# Patient Record
Sex: MALE
[De-identification: names, ages, dates, MRNs, and addresses within clinical notes are randomized per-mention and may not be internally consistent; named-entity substitution may affect disease eponyms.]

---

## 2022-04-16 ENCOUNTER — TRANSCRIPTION ENCOUNTER (OUTPATIENT)
Age: 32
End: 2022-04-16

## 2023-12-26 ENCOUNTER — NON-APPOINTMENT (OUTPATIENT)
Age: 33
End: 2023-12-26

## 2024-07-21 ENCOUNTER — EMERGENCY (EMERGENCY)
Facility: HOSPITAL | Age: 34
LOS: 0 days | Discharge: HOME | End: 2024-07-21
Attending: EMERGENCY MEDICINE
Payer: COMMERCIAL

## 2024-07-21 VITALS
TEMPERATURE: 98 F | RESPIRATION RATE: 20 BRPM | WEIGHT: 179.9 LBS | HEART RATE: 77 BPM | HEIGHT: 68 IN | DIASTOLIC BLOOD PRESSURE: 72 MMHG | OXYGEN SATURATION: 99 % | SYSTOLIC BLOOD PRESSURE: 112 MMHG
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
## 2024-07-21 DIAGNOSIS — S63.502A UNSPECIFIED SPRAIN OF LEFT WRIST, INITIAL ENCOUNTER: ICD-10-CM

## 2024-07-21 DIAGNOSIS — S70.312A ABRASION, LEFT THIGH, INITIAL ENCOUNTER: ICD-10-CM

## 2024-07-21 DIAGNOSIS — S70.311A ABRASION, RIGHT THIGH, INITIAL ENCOUNTER: ICD-10-CM

## 2024-07-21 DIAGNOSIS — M25.532 PAIN IN LEFT WRIST: ICD-10-CM
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
## 2024-07-21 PROCEDURE — 99284 EMERGENCY DEPT VISIT MOD MDM: CPT

## 2024-07-21 PROCEDURE — 73110 X-RAY EXAM OF WRIST: CPT | Mod: LT

## 2024-07-21 PROCEDURE — 99283 EMERGENCY DEPT VISIT LOW MDM: CPT | Mod: 25

## 2024-07-21 PROCEDURE — 73110 X-RAY EXAM OF WRIST: CPT | Mod: 26,LT

## 2024-07-21 NOTE — ED PROVIDER NOTE - NSFOLLOWUPINSTRUCTIONS_ED_ALL_ED_FT
Our Emergency Department Referral Coordinators will be reaching out to you in the next 24-48 hours from 9:00am to 5:00pm to schedule a follow up appointment. Please expect a phone call from the hospital in that time frame. If you do not receive a call or if you have any questions or concerns, you can reach them at   [phone].    PLEASE FOLLOW UP WITH PMD AND ORTHO.     Wrist Pain, Adult    There are many things that can cause wrist pain. Some common causes include:    An injury to the wrist area, such as a sprain, strain, or fracture.  Overuse of the joint.  A condition that causes increased pressure on a nerve in the wrist (carpal tunnel syndrome).  Wear and tear of the joints that occurs with aging (osteoarthritis).  A variety of other types of arthritis.    Sometimes, the cause of wrist pain is not known. Often, the pain goes away when you follow instructions from your health care provider for relieving pain at home, such as resting or icing the wrist. If your wrist pain continues, it is important to tell your health care provider.    Follow these instructions at home:  Rest the wrist area for at least 48 hours or as long as told by your health care provider.  If a splint or elastic bandage has been applied, use it as told by your health care provider.    Remove the splint or bandage only as told by your health care provider.  Loosen the splint or bandage if your fingers tingle, become numb, or turn cold or blue.    ImageIf directed, apply ice to the injured area.    If you have a removable splint or elastic bandage, remove it as told by your health care provider.  Put ice in a plastic bag.  Place a towel between your skin and the bag or between your splint or bandage and the bag.  Leave the ice on for 20 minutes, 2–3 times a day.    Keep your arm raised (elevated) above the level of your heart while you are sitting or lying down.  Take over-the-counter and prescription medicines only as told by your health care provider.  Keep all follow-up visits as told by your health care provider. This is important.  Contact a health care provider if:  You have a sudden sharp pain in the wrist, hand, or arm that is different or new.  The swelling or bruising on your wrist or hand gets worse.  Your skin becomes red, gets a rash, or has open sores.  Your pain does not get better or it gets worse.  Get help right away if:  You lose feeling in your fingers or hand.  Your fingers turn white, very red, or cold and blue.  You cannot move your fingers.  You have a fever or chills.  This information is not intended to replace advice given to you by your health care provider. Make sure you discuss any questions you have with your health care provider.    Abrasion     An abrasion is a cut or a scrape on the outer surface of the skin. An abrasion does not go through all the layers of the skin. It is important to care for your abrasion properly to prevent infection.    What are the causes?  This condition is caused by falling on or gliding across the ground or another surface. When your skin rubs on something, the outer and inner layers of skin may rub off.    What are the signs or symptoms?  The main symptom of this condition is a cut or a scrape. The scrape may be bleeding, or it may appear red or pink. If the abrasion was caused by a fall, there may be a bruise under the cut or scrape.    How is this diagnosed?  An abrasion is diagnosed with a physical exam.    How is this treated?  Treatment for this condition depends on how large and deep the abrasion is. In most cases:  Your abrasion will be cleaned with water and mild soap. This is done to remove any dirt or debris (such as particles of glass or rock) that may be stuck in the wound.  An antibiotic ointment may be applied to the abrasion to help prevent infection.  A bandage (dressing) may be placed on the abrasion to keep it clean.  You may also need a tetanus shot.    Follow these instructions at home:  Medicines     Take or apply over-the-counter and prescription medicines only as told by your health care provider.  If you were prescribed an antibiotic medicine, apply it as told by your health care provider.  Wound care     Clean the wound 2–3 times a day, or as directed by your health care provider. To do this, wash the wound with mild soap and water, rinse off the soap, and pat the wound dry with a clean towel. Do not rub the wound.  Keep the dressing clean and dry as told by your health care provider.  There are many different ways to close and cover a wound. Follow instructions from your health care provider about:  Caring for your wound.  Changing and removing your dressing. You may have to change your dressing one or more times a day, or as directed by your health care provider.  Check your wound every day for signs of infection. Check for:  Redness, particularly a red streak that spreads out from the wound.  Swelling or increased pain.  Warmth.  Fluid, pus, or a bad smell.  If directed, put ice on the injured area to reduce pain and swelling:  Put ice in a plastic bag.   Place a towel between your skin and the bag.   Leave the ice on for 20 minutes, 2–3 times a day.  General instructions     Do not take baths, swim, or use a hot tub until your health care provider says it is okay to do so.  If possible, raise (elevate) the injured area above the level of your heart while you are sitting or lying down. This will reduce pain and swelling.  Keep all follow-up visits as directed by your health care provider. This is important.  Contact a health care provider if:  You received a tetanus shot, and you have swelling, severe pain, redness, or bleeding at the injection site.  Your pain is not controlled with medicine.  You have redness, swelling, or more pain at the site of your wound.  Get help right away if:  You have a red streak spreading away from your wound.  You have a fever.  You have fluid, blood, or pus coming from your wound.  You notice a bad smell coming from your wound or your dressing.  Summary  An abrasion is a cut or a scrape on the outer surface of the skin. An abrasion does not go through all the layers of the skin.  Care for your abrasion properly to prevent infection.  Clean the wound with mild soap and water 2–3 times a day. Follow instructions from your health care provider about taking medicines and changing your bandage (dressing).  Contact your health care provider if you have redness, swelling or more pain in the wound area.  Get help right away if you have a fever or if you have fluid, blood, pus, a bad smell, or a red streak coming from the wound.  This information is not intended to replace advice given to you by your health care provider. Make sure you discuss any questions you have with your health care provider.

## 2024-07-21 NOTE — ED PROVIDER NOTE - PATIENT PORTAL LINK FT
You can access the FollowMyHealth Patient Portal offered by [facility] by registering at the following website: [email or web address]. By joining [facility]’s FollowMyHealth portal, you will also be able to view your health information using other applications (apps) compatible with our system.

## 2024-07-21 NOTE — ED PROVIDER NOTE - PROGRESS NOTE DETAILS
ED Attending KYLIE Agrawal  Patient aware of x-ray, able to fully range wrist, did not want anything for the pain, aware of x-ray, placed in Ace bandage, understands symptomatic treatment, RICE, signs and symptoms to return for.  Reports will follow-up with PMD and Ortho.  Neurovascularly intact.  Patient comfortable and agrees with plan.

## 2024-07-21 NOTE — ED PROVIDER NOTE - DIFFERENTIAL DIAGNOSIS
The differential diagnosis for patients clinical presentation includes but is not limited to:  fracture, sprain, strain, abrasion , contusion, dislocation Differential Diagnosis

## 2024-07-21 NOTE — ED PROVIDER NOTE - OBJECTIVE STATEMENT
34-year-old male with no significant past medical history, up-to-date on tetanus, presents with left wrist pain after playing soccer around 10 AM, reports localized to the medial portion of the left wrist, throbbing, mild, nonradiating, worse with movement or palpation, better at rest.  Patient reports he took Tylenol at 3:30 PM.  Does not want anything for the pain in the ED.  Patient also sustained superficial abrasions to the bilateral thighs but denies any pain or discomfort to these areas, no knee pain, no ankle pain, no other pain.  Patient reports he came in for the wrist pain.  No head trauma or LOC.   No fever, chills, nausea, vomiting, numbness/tingling, decreased sensation, hearing a click/feeling a pop, or any other trauma.

## 2024-07-21 NOTE — ED PROVIDER NOTE - CLINICAL SUMMARY MEDICAL DECISION MAKING FREE TEXT BOX
On Exam:   Vital Signs: I have reviewed the initial vital signs.  Constitutional: Non toxic appearing pt sitting on stretcher speaking full sentences.   Integumentary: No rash. No lacerations, swelling or ecchymoses. b/l thighs with superficial abrasions. no erythema  or streaking, no warmth to palpation, no crepitus, induration, fluctuance, no discharge,no abscess, (+) soft compartments.  ENT: MMM  NECK: Supple, non-tender, no meningeal signs.  Cardiovascular: RRR, radial pulses 2/4 b/l.Cap refill < 2 seconds.   Respiratory: BS present b/l, ctabl, no wheezing or crackles, good air exchange, good resp effort and excursion, no accessory muscle use, no stridor.  Gastrointestinal: BS present throughout all 4 quadrants, soft, nd, nt no rebound tenderness or guarding, no cvat.  Musculoskeletal: FROM, no edema, no calf pain/swelling/erythema. No obvious bony deformity. Good finger opposition, FROM of L wrist in flexion, extension, ulna and radial deviation. Pain worse with extension and ulna radiation. No snuff box tenderness. Pain to palpation to L medial wrist over ulna aspect. FROM of L elbow in flexion, extensions, supination and pronation, and L shoulder in flexion, extension, abduction, and adduction with no pain to palpation.  Neurologic: AAOx3, motor 5/5 and sensation intact throughout upper and lower ext, CN II-XII intact, No facial droop or slurring of speech. No focal deficits.  Imaging was ordered and reviewed by me.  Additional history was obtained from significant other. Escalation to admission/observation was considered. However patient feels much better and is comfortable with discharge.  Appropriate follow-up was arranged.  Supportive care and home care discussed in detail. Patient aware they may have to return for re-evaluation and possible admission if outpatient treatment fails. Strict return precautions discussed.  will follow up with ortho and pmd On Exam:   Vital Signs: I have reviewed the initial vital signs.  Constitutional: Non toxic appearing pt sitting on stretcher speaking full sentences.   Integumentary: No rash. No lacerations, swelling or ecchymoses. b/l thighs with superficial abrasions. no erythema  or streaking, no warmth to palpation, no crepitus, induration, fluctuance, no discharge,no abscess, (+) soft compartments.  ENT: MMM  NECK: Supple, non-tender, no meningeal signs.  Cardiovascular: RRR, radial pulses 2/4 b/l.Cap refill < 2 seconds.   Respiratory: BS present b/l, ctabl, no wheezing or crackles, good air exchange, good resp effort and excursion, no accessory muscle use, no stridor.  Gastrointestinal: BS present throughout all 4 quadrants, soft, nd, nt no rebound tenderness or guarding, no cvat.  Musculoskeletal: FROM, no edema, no calf pain/swelling/erythema. No obvious bony deformity. Good finger opposition, FROM of L wrist in flexion, extension, ulna and radial deviation. Pain worse with extension and ulna radiation. No snuff box tenderness. Pain to palpation to L medial wrist over ulna aspect. FROM of L elbow in flexion, extensions, supination and pronation, and L shoulder in flexion, extension, abduction, and adduction with no pain to palpation.  Neurologic: AAOx3, motor 5/5 and sensation intact throughout upper and lower ext, CN II-XII intact, No facial droop or slurring of speech. No focal deficits.  Imaging was ordered and reviewed by me.  Additional history was obtained from significant other. Escalation to admission/observation was considered. However patient feels much better and is comfortable with discharge.  Appropriate follow-up was arranged.  Supportive care and home care discussed in detail. Patient aware they may have to return for re-evaluation and possible admission if outpatient treatment fails. Strict return precautions discussed. Escalation to admission/observation was considered. However patient feels much better and is comfortable with discharge.  Appropriate follow-up was arranged.  Supportive care and home care discussed in detail. Patient aware they may have to return for re-evaluation and possible admission if outpatient treatment fails. Strict return precautions discussed.    will follow up with ortho and pmd. Patient aware of x-ray, able to fully range wrist, did not want anything for the pain, aware of x-ray, placed in Ace bandage, understands symptomatic treatment, RICE, signs and symptoms to return for.  Reports will follow-up with PMD and Ortho.  Neurovascularly intact.  Patient comfortable and agrees with plan.

## 2024-07-21 NOTE — ED PROVIDER NOTE - NSFOLLOWUPCLINICS_GEN_ALL_ED_FT
[facility]  Orthpedic  [address], [place]   Phone: [phone]  Fax:

## 2024-07-21 NOTE — ED ADULT TRIAGE NOTE - TEMPERATURE IN FAHRENHEIT (DEGREES F)
98.3 I have personally provided the amount of critical care time documented below concurrently with the resident/fellow.  This time excludes time spent on separate procedures and time spent teaching. I have reviewed the resident’s / fellow’s documentation and I agree with the history, exam, and assessment and plan of care.

## 2024-07-21 NOTE — ED PROVIDER NOTE - PHYSICAL EXAMINATION
On Exam:   Vital Signs: I have reviewed the initial vital signs.  Constitutional: Non toxic appearing pt sitting on stretcher speaking full sentences.   Integumentary: No rash. No lacerations, swelling or ecchymoses. b/l thighs with superficial abrasions. no erythema  or streaking, no warmth to palpation, no crepitus, induration, fluctuance, no discharge,no abscess, (+) soft compartments.  ENT: MMM  NECK: Supple, non-tender, no meningeal signs.  Cardiovascular: RRR, radial pulses 2/4 b/l.Cap refill < 2 seconds.   Respiratory: BS present b/l, ctabl, no wheezing or crackles, good air exchange, good resp effort and excursion, no accessory muscle use, no stridor.  Gastrointestinal: BS present throughout all 4 quadrants, soft, nd, nt no rebound tenderness or guarding, no cvat.  Musculoskeletal: FROM, no edema, no calf pain/swelling/erythema. No obvious bony deformity. Good finger opposition, FROM of L wrist in flexion, extension, ulna and radial deviation. Pain worse with extension and ulna radiation. No snuff box tenderness. Pain to palpation to L medial wrist over ulna aspect. FROM of L elbow in flexion, extensions, supination and pronation, and L shoulder in flexion, extension, abduction, and adduction with no pain to palpation.  Neurologic: AAOx3, motor 5/5 and sensation intact throughout upper and lower ext, CN II-XII intact, No facial droop or slurring of speech. No focal deficits.

## 2025-06-16 NOTE — ED ADULT NURSE NOTE - OBJECTIVE STATEMENT
33 y/o male presents to ED c/o left wrist pain and right knee abrasion while playing soccer
0 = swallows foods/liquids without difficulty